# Patient Record
Sex: FEMALE | Race: WHITE | NOT HISPANIC OR LATINO | Employment: FULL TIME | URBAN - NONMETROPOLITAN AREA
[De-identification: names, ages, dates, MRNs, and addresses within clinical notes are randomized per-mention and may not be internally consistent; named-entity substitution may affect disease eponyms.]

---

## 2023-10-16 RX ORDER — ACETAMINOPHEN 325 MG/1
TABLET ORAL EVERY 6 HOURS
COMMUNITY
Start: 2022-03-12 | End: 2023-10-17 | Stop reason: WASHOUT

## 2023-10-16 RX ORDER — PRENATAL VIT 49/IRON FUM/FOLIC 6.75-0.2MG
TABLET ORAL
COMMUNITY

## 2023-10-16 RX ORDER — SERTRALINE HYDROCHLORIDE 50 MG/1
1 TABLET, FILM COATED ORAL DAILY
COMMUNITY
Start: 2022-05-10 | End: 2023-10-17 | Stop reason: WASHOUT

## 2023-10-16 RX ORDER — IBUPROFEN 600 MG/1
TABLET ORAL EVERY 6 HOURS
COMMUNITY
Start: 2022-03-12 | End: 2023-10-17 | Stop reason: WASHOUT

## 2023-10-17 ENCOUNTER — OFFICE VISIT (OUTPATIENT)
Dept: PRIMARY CARE | Facility: CLINIC | Age: 33
End: 2023-10-17
Payer: COMMERCIAL

## 2023-10-17 VITALS
DIASTOLIC BLOOD PRESSURE: 87 MMHG | BODY MASS INDEX: 27.38 KG/M2 | HEIGHT: 62 IN | HEART RATE: 82 BPM | SYSTOLIC BLOOD PRESSURE: 131 MMHG | WEIGHT: 148.8 LBS | RESPIRATION RATE: 14 BRPM | TEMPERATURE: 97.6 F | OXYGEN SATURATION: 98 %

## 2023-10-17 DIAGNOSIS — E66.3 OVERWEIGHT WITH BODY MASS INDEX (BMI) OF 27 TO 27.9 IN ADULT: ICD-10-CM

## 2023-10-17 DIAGNOSIS — Z23 IMMUNIZATION DUE: ICD-10-CM

## 2023-10-17 DIAGNOSIS — E78.5 HYPERLIPIDEMIA, UNSPECIFIED HYPERLIPIDEMIA TYPE: ICD-10-CM

## 2023-10-17 DIAGNOSIS — Z00.00 HEALTHCARE MAINTENANCE: Primary | ICD-10-CM

## 2023-10-17 PROBLEM — R03.0 ELEVATED BLOOD PRESSURE READING WITHOUT DIAGNOSIS OF HYPERTENSION: Status: ACTIVE | Noted: 2023-10-17

## 2023-10-17 PROBLEM — F32.1 DEPRESSION, MAJOR, SINGLE EPISODE, MODERATE (MULTI): Status: ACTIVE | Noted: 2023-10-17

## 2023-10-17 PROCEDURE — 90686 IIV4 VACC NO PRSV 0.5 ML IM: CPT | Performed by: FAMILY MEDICINE

## 2023-10-17 PROCEDURE — 99395 PREV VISIT EST AGE 18-39: CPT | Performed by: FAMILY MEDICINE

## 2023-10-17 PROCEDURE — 3008F BODY MASS INDEX DOCD: CPT | Performed by: FAMILY MEDICINE

## 2023-10-17 PROCEDURE — 90471 IMMUNIZATION ADMIN: CPT | Performed by: FAMILY MEDICINE

## 2023-10-17 NOTE — PATIENT INSTRUCTIONS
A daily supplement with powerful anti-inflammatory properties is ASTAXANTHIN (similar to beta-carotine).    This anti-oxidant can decrease inflammation, improve insulin sensitivity, decrease plaque formation in arteries, and decrease free radicals that cause cancers.      Take anywhere from 5-18 mg daily for 3 months.     These can be purchased at health stores such as [employer] or on Amazon.         Repeat this 3 month cycle twice a year.      Healthcare maintenance  Vaccines and screenings reviewed.  Questionnaires completed.  Health and wellness topics reviewed.  Diet and exercise recommendations revisited.  Routine blood work ordered today.     VACCINES:  -TDAP up to date until 2032.  -Flu vaccine recommended, administered today.  -Shingrix recommended ages 50+.    SCREENINGS:  -PAP is up to date via GYN  -Screening mammogram recommended starting at age 40  -Screening for colon cancer recommended starting at age 45.    LIFESTYLE MEASURES  -make sure you are avoiding refined carbs such as breads, pasta, cereal, candy, soda,  nutrition bars, granola, chips, and sugar sweetened beverages.      -eat 5- 7 servings daily of veggies,  healthy protein such as chicken, fish,  beans, and eggs, and include healthy fats in your diet such as seeds, nuts, olive oil, avocados, and salmon.   -exercise 4 - 6 days per week as you are able, 150 minutes total weekly divided up is recommended.  -Vitamin D is recommended at 1000 - 5000 IU international units daily.   -Always wear sunscreen when you have sun exposure.  -64 oz of water is recommended daily.  -Dental visits recommended every 6 months.  -Eye exam recommended every 2 years, for those with vision problems every year.      Hyperlipidemia  Lifestyle managed,TC/HDL ratio at goal but LDL elevated.  5/2022 TC/HDL ratio 2.9, , TRIG 68  Repeat lipid panel ordered today.    To lower this with lifestyle measures:  -make sure you are avoiding refined carbs such as breads, pasta,  cereal, candy, soda,  nutrition bars, granola, chips, and sugar sweetened beverages.      -eat 5- 7 servings daily of veggies,  healthy protein such as chicken, fish,  beans, and eggs, and include healthy fats in your diet such as seeds, nuts, olive oil, avocados, and salmon.   -exercise 4 - 6 days per week as you are able, 150 minutes total weekly divided up is recommended.  -consider taking the fiber product psyllium capsules or powder 2 times daily (generic brand is fine) , or a brand name psyllium such as [facility] or Metamucil.  -consider also adding plant stanols and sterols such as Nature Made CholestOff, available over the counter.      Fasting labs have been ordered, please do at your convenience.

## 2023-10-17 NOTE — PROGRESS NOTES
Subjective   Patient ID: Andrew Heath is a 33 y.o. female who presents for Annual Exam (Pt presents for annual physical exam- Abn given to pt and signed, pt verbalized understanding. Pt states no concerns at this time. ).    HPI     Patient presents today for annual physical.  Patient is doing well overall, they have no new concerns or issues.     WELLNESS VISIT  TDAP: 1/2022  SHINGRIX: N/A  PNEUMOVAX: N/A  PAP: 7/2023 - managed by GYN (Dr. Page)  MAMMO: dx in 2020 for benign lump evaluated by breast surgeon, rec annual screenings starting at 40  CSCOPE: N/A  DEXA: N/A  HEP C SCREEN: 8/2021 negative  CACS: N/A  LIPID: 5/2022 TC/HDL ratio 2.9, , TRIG 68    She has 3 kids (7, 5 , 1.5)  Older two are boys and really good with their younger sister.   had vasectomy    Diet: tries to be as balanced as possible, kids are picky but getting better which is helpful.    Exercise: no recently due to busy schedule with work/kids sports, etc. She notes there is schedule change that would allow her to quick routine at school before she has to pick her kids.    Dental visits up to date.  Vision screening up to date.    Patient is agreeable to influenza vaccine.    Cervical cancer screening: UTD, managed by GYN  Denies family history in first degree relative.  Denies pelvic pain, vaginal discharge, or vaginal bleeding.    Breast cancer screening: N/A  Denies family history in first degree relative.  Denies lumps/bumps, skin changes, nipple retraction, or nipple drainage.    Colon cancer screening: N/A  Denies family history in first degree relative.  Denies melena, hematochezia, constipation, diarrhea, bloating, change in bowel habits.    ROUTINE VISIT  CHRONIC CONDITIONS:    -Elevated BP readings  Denies hx of HTN  States usually normal when checked manually.    Patient denies chest pain, shortness of breath with exertion, palpitations, lower extremity edema, headache, or dizziness.     -MOOD  Hx of  "depression  Taking Sertraline 50 mg daily, started 7/2022    -HLD  Lifestyle managed,TC/HDL ratio at goal but LDL elevated.  No CACS due to age.  5/2022 TC/HDL ratio 2.9, , TRIG 68    Patient denies any facial droop, sudden vision loss, weakness or numbness on one side of body.   Patient denies chest pain, shortness of breath with exertion, palpitations, or symptoms of claudication.       Review of Systems   All other systems reviewed and are negative.      Objective   /87 (BP Location: Left arm, Patient Position: Sitting, BP Cuff Size: Small adult)   Pulse 82   Temp 36.4 °C (97.6 °F) (Temporal)   Resp 14   Ht 1.562 m (5' 1.5\")   Wt 67.5 kg (148 lb 12.8 oz)   LMP 10/03/2023 (Approximate)   SpO2 98%   BMI 27.66 kg/m²     Physical Exam  Vitals and nursing note reviewed.   Constitutional:       General: She is not in acute distress.     Appearance: Normal appearance. She is not toxic-appearing.   HENT:      Head: Normocephalic and atraumatic.   Eyes:      Extraocular Movements: Extraocular movements intact.      Pupils: Pupils are equal, round, and reactive to light.   Neck:      Thyroid: No thyromegaly.   Cardiovascular:      Rate and Rhythm: Normal rate and regular rhythm.      Heart sounds: No murmur heard.     No friction rub. No gallop.   Pulmonary:      Effort: Pulmonary effort is normal.      Breath sounds: Normal breath sounds. No wheezing, rhonchi or rales.   Abdominal:      General: Bowel sounds are normal. There is no distension.      Palpations: Abdomen is soft. There is no mass.      Tenderness: There is no abdominal tenderness. There is no guarding.   Musculoskeletal:      Right lower leg: No edema.      Left lower leg: No edema.   Lymphadenopathy:      Cervical: No cervical adenopathy.   Skin:     General: Skin is warm and dry.   Neurological:      General: No focal deficit present.      Mental Status: She is alert and oriented to person, place, and time.   Psychiatric:         Mood " and Affect: Mood normal.         Behavior: Behavior normal.         Assessment/Plan   Problem List Items Addressed This Visit             ICD-10-CM    Hyperlipidemia E78.5     Lifestyle managed,TC/HDL ratio at goal but LDL elevated.  5/2022 TC/HDL ratio 2.9, , TRIG 68  Repeat lipid panel ordered today.    To lower this with lifestyle measures:  -make sure you are avoiding refined carbs such as breads, pasta, cereal, candy, soda,  nutrition bars, granola, chips, and sugar sweetened beverages.      -eat 5- 7 servings daily of veggies,  healthy protein such as chicken, fish,  beans, and eggs, and include healthy fats in your diet such as seeds, nuts, olive oil, avocados, and salmon.   -exercise 4 - 6 days per week as you are able, 150 minutes total weekly divided up is recommended.  -consider taking the fiber product psyllium capsules or powder 2 times daily (generic brand is fine) , or a brand name psyllium such as [place] [facility] or Metamucil.  -consider also adding plant stanols and sterols such as Nature Made CholestOff, available over the counter.          Healthcare maintenance - Primary Z00.00     Vaccines and screenings reviewed.  Questionnaires completed.  Health and wellness topics reviewed.  Diet and exercise recommendations revisited.  Routine blood work ordered today.     VACCINES:  -TDAP up to date until 2032.  -Flu vaccine recommended, administered today.  -Shingrix recommended ages 50+.    SCREENINGS:  -PAP is up to date via GYN  -Screening mammogram recommended starting at age 40  -Screening for colon cancer recommended starting at age 45.    LIFESTYLE MEASURES  -make sure you are avoiding refined carbs such as breads, pasta, cereal, candy, soda,  nutrition bars, granola, chips, and sugar sweetened beverages.      -eat 5- 7 servings daily of veggies,  healthy protein such as chicken, fish,  beans, and eggs, and include healthy fats in your diet such as seeds, nuts, olive oil, avocados, and  yan.   -exercise 4 - 6 days per week as you are able, 150 minutes total weekly divided up is recommended.  -Vitamin D is recommended at 1000 - 5000 IU international units daily.   -Always wear sunscreen when you have sun exposure.  -64 oz of water is recommended daily.  -Dental visits recommended every 6 months.  -Eye exam recommended every 2 years, for those with vision problems every year.           Relevant Orders    CBC    Comprehensive Metabolic Panel    Lipid Panel     Other Visit Diagnoses         Codes    Immunization due     Z23    Relevant Orders    Flu vaccine (IIV4) age 6 months and greater, preservative free    Overweight with body mass index (BMI) of 27 to 27.9 in adult     E66.3, Z68.27          Follow-up in 1 year for annual physical.   Call for sooner follow-up if needed.     Scribe Attestation  By signing my name below, IRadha, Reginaldo   attest that this documentation has been prepared under the direction and in the presence of Tami Tineo DO.

## 2023-10-17 NOTE — ASSESSMENT & PLAN NOTE
Lifestyle managed,TC/HDL ratio at goal but LDL elevated.  5/2022 TC/HDL ratio 2.9, , TRIG 68  Repeat lipid panel ordered today.    To lower this with lifestyle measures:  -make sure you are avoiding refined carbs such as breads, pasta, cereal, candy, soda,  nutrition bars, granola, chips, and sugar sweetened beverages.      -eat 5- 7 servings daily of veggies,  healthy protein such as chicken, fish,  beans, and eggs, and include healthy fats in your diet such as seeds, nuts, olive oil, avocados, and salmon.   -exercise 4 - 6 days per week as you are able, 150 minutes total weekly divided up is recommended.  -consider taking the fiber product psyllium capsules or powder 2 times daily (generic brand is fine) , or a brand name psyllium such as [employer] or Metamucil.  -consider also adding plant stanols and sterols such as Nature Made CholestOff, available over the counter.

## 2023-10-17 NOTE — ASSESSMENT & PLAN NOTE
Vaccines and screenings reviewed.  Questionnaires completed.  Health and wellness topics reviewed.  Diet and exercise recommendations revisited.  Routine blood work ordered today.     VACCINES:  -TDAP up to date until 2032.  -Flu vaccine recommended, administered today.  -Shingrix recommended ages 50+.    SCREENINGS:  -PAP is up to date via GYN  -Screening mammogram recommended starting at age 40  -Screening for colon cancer recommended starting at age 45.    LIFESTYLE MEASURES  -make sure you are avoiding refined carbs such as breads, pasta, cereal, candy, soda,  nutrition bars, granola, chips, and sugar sweetened beverages.      -eat 5- 7 servings daily of veggies,  healthy protein such as chicken, fish,  beans, and eggs, and include healthy fats in your diet such as seeds, nuts, olive oil, avocados, and salmon.   -exercise 4 - 6 days per week as you are able, 150 minutes total weekly divided up is recommended.  -Vitamin D is recommended at 1000 - 5000 IU international units daily.   -Always wear sunscreen when you have sun exposure.  -64 oz of water is recommended daily.  -Dental visits recommended every 6 months.  -Eye exam recommended every 2 years, for those with vision problems every year.

## 2023-10-19 ENCOUNTER — TELEPHONE (OUTPATIENT)
Dept: PRIMARY CARE | Facility: CLINIC | Age: 33
End: 2023-10-19
Payer: COMMERCIAL

## 2023-10-19 NOTE — TELEPHONE ENCOUNTER
Patient had appointment with you this past Tuesday    She wanted you to be aware she tested positive for covid yesterday     She does not need anything, feels fine but just an FYI

## 2024-06-14 ENCOUNTER — LAB (OUTPATIENT)
Dept: LAB | Facility: LAB | Age: 34
End: 2024-06-14
Payer: COMMERCIAL

## 2024-06-14 DIAGNOSIS — Z00.00 HEALTHCARE MAINTENANCE: ICD-10-CM

## 2024-06-14 LAB
ALBUMIN SERPL BCP-MCNC: 4.4 G/DL (ref 3.4–5)
ALP SERPL-CCNC: 37 U/L (ref 33–110)
ALT SERPL W P-5'-P-CCNC: 9 U/L (ref 7–45)
ANION GAP SERPL CALC-SCNC: 11 MMOL/L (ref 10–20)
AST SERPL W P-5'-P-CCNC: 11 U/L (ref 9–39)
BILIRUB SERPL-MCNC: 0.5 MG/DL (ref 0–1.2)
BUN SERPL-MCNC: 11 MG/DL (ref 6–23)
CALCIUM SERPL-MCNC: 9.1 MG/DL (ref 8.6–10.6)
CHLORIDE SERPL-SCNC: 106 MMOL/L (ref 98–107)
CHOLEST SERPL-MCNC: 182 MG/DL (ref 0–199)
CHOLESTEROL/HDL RATIO: 2.7
CO2 SERPL-SCNC: 29 MMOL/L (ref 21–32)
CREAT SERPL-MCNC: 0.72 MG/DL (ref 0.5–1.05)
EGFRCR SERPLBLD CKD-EPI 2021: >90 ML/MIN/1.73M*2
ERYTHROCYTE [DISTWIDTH] IN BLOOD BY AUTOMATED COUNT: 12.7 % (ref 11.5–14.5)
GLUCOSE SERPL-MCNC: 89 MG/DL (ref 74–99)
HCT VFR BLD AUTO: 39.8 % (ref 36–46)
HDLC SERPL-MCNC: 66.8 MG/DL
HGB BLD-MCNC: 12.7 G/DL (ref 12–16)
LDLC SERPL CALC-MCNC: 105 MG/DL
MCH RBC QN AUTO: 28 PG (ref 26–34)
MCHC RBC AUTO-ENTMCNC: 31.9 G/DL (ref 32–36)
MCV RBC AUTO: 88 FL (ref 80–100)
NON HDL CHOLESTEROL: 115 MG/DL (ref 0–149)
NRBC BLD-RTO: 0 /100 WBCS (ref 0–0)
PLATELET # BLD AUTO: 266 X10*3/UL (ref 150–450)
POTASSIUM SERPL-SCNC: 4.1 MMOL/L (ref 3.5–5.3)
PROT SERPL-MCNC: 6.4 G/DL (ref 6.4–8.2)
RBC # BLD AUTO: 4.54 X10*6/UL (ref 4–5.2)
SODIUM SERPL-SCNC: 142 MMOL/L (ref 136–145)
TRIGL SERPL-MCNC: 50 MG/DL (ref 0–149)
VLDL: 10 MG/DL (ref 0–40)
WBC # BLD AUTO: 6 X10*3/UL (ref 4.4–11.3)

## 2024-06-14 PROCEDURE — 80053 COMPREHEN METABOLIC PANEL: CPT

## 2024-06-14 PROCEDURE — 85027 COMPLETE CBC AUTOMATED: CPT

## 2024-06-14 PROCEDURE — 80061 LIPID PANEL: CPT

## 2024-06-14 PROCEDURE — 36415 COLL VENOUS BLD VENIPUNCTURE: CPT

## 2024-06-14 NOTE — RESULT ENCOUNTER NOTE
Total and bad cholesterol have dropped nicely since last check.  Blood counts are all reassuring, WNL  Kidney/liver/sugar/electrolyte panel is all reassuring  Continue current efforts

## 2024-08-05 ENCOUNTER — APPOINTMENT (OUTPATIENT)
Dept: OBSTETRICS AND GYNECOLOGY | Facility: CLINIC | Age: 34
End: 2024-08-05
Payer: COMMERCIAL

## 2024-08-05 VITALS
WEIGHT: 162 LBS | DIASTOLIC BLOOD PRESSURE: 78 MMHG | SYSTOLIC BLOOD PRESSURE: 120 MMHG | HEIGHT: 62 IN | BODY MASS INDEX: 29.81 KG/M2

## 2024-08-05 DIAGNOSIS — Z01.419 WELL WOMAN EXAM: ICD-10-CM

## 2024-08-05 PROCEDURE — 88175 CYTOPATH C/V AUTO FLUID REDO: CPT

## 2024-08-05 PROCEDURE — 3008F BODY MASS INDEX DOCD: CPT | Performed by: OBSTETRICS & GYNECOLOGY

## 2024-08-05 PROCEDURE — 99395 PREV VISIT EST AGE 18-39: CPT | Performed by: OBSTETRICS & GYNECOLOGY

## 2024-08-05 ASSESSMENT — PAIN SCALES - GENERAL: PAINLEVEL: 0-NO PAIN

## 2024-08-05 NOTE — PROGRESS NOTES
"Andrew Heath is a 34 y.o. female who is here for a routine exam. PCP = Tami Tineo, DO    Menses : monthly  Contraception : vasectomy  HPV vaccine : No  Last pap : 2023 normal  Last HPV : 2022 negative  History of abnormal pap : No  Last mammogram : never  History of abnormal mammogram : No  Colon cancer screen : never    ROS  systems reviewed, anything negative noted in HPI    bladder: no dysuria, gross hematuria, urinary frequency, urgency or incontinence  breast: no lumps, nipple d/c, overlying skin changes, redness, or skin retraction    [unfilled]    Past med hx and past surg hx reviewed and notable for: none    Objective   /78   Ht 1.562 m (5' 1.5\")   Wt 73.5 kg (162 lb)   LMP 07/19/2024 (Approximate)   BMI 30.11 kg/m²      General:   Alert and oriented, in no acute distress   Neck: Supple. No visible thyromegaly.    Breast/Axilla: Normal to palpation bilaterally without masses, skin changes, lymphadenopathy, or nipple discharge.    Abdomen: Soft, non-tender, without masses or organomegaly   Vulva: Normal architecture without erythema, masses, or lesions.    Vagina: Normal mucosa without lesions, masses, or atrophy. No abnormal vaginal discharge.    Cervix: Normal without masses, lesions, or signs of cervicitis.    Uterus: Normal mobile, non-enlarged uterus    Adnexa: Normal without masses or lesions   Pelvic Floor No POP noted.    Psych Normal affect. Normal mood.      Thank you for coming to your annual exam. Your findings during the exam were normal. Specific topics addressed during this exam included: healthy lifestyle, well woman screening guidelines,     Actions performed during this visit include:  - Clinical breast exam  - Clinical pelvic exam  - pap  No orders of the defined types were placed in this encounter.          Please return for your next visit in 1 year.  "

## 2024-08-19 LAB
CYTOLOGY CMNT CVX/VAG CYTO-IMP: NORMAL
LAB AP HPV GENOTYPE QUESTION: YES
LAB AP HPV HR: NORMAL
LABORATORY COMMENT REPORT: NORMAL
LMP START DATE: NORMAL
PATH REPORT.TOTAL CANCER: NORMAL

## 2024-10-02 ENCOUNTER — OFFICE VISIT (OUTPATIENT)
Dept: URGENT CARE | Age: 34
End: 2024-10-02
Payer: COMMERCIAL

## 2024-10-02 VITALS
RESPIRATION RATE: 16 BRPM | DIASTOLIC BLOOD PRESSURE: 85 MMHG | WEIGHT: 162 LBS | HEART RATE: 77 BPM | OXYGEN SATURATION: 99 % | TEMPERATURE: 97.6 F | BODY MASS INDEX: 30.58 KG/M2 | SYSTOLIC BLOOD PRESSURE: 125 MMHG | HEIGHT: 61 IN

## 2024-10-02 DIAGNOSIS — J02.9 PHARYNGITIS, UNSPECIFIED ETIOLOGY: ICD-10-CM

## 2024-10-02 LAB — POC RAPID STREP: NEGATIVE

## 2024-10-02 PROCEDURE — 87651 STREP A DNA AMP PROBE: CPT

## 2024-10-02 NOTE — LETTER
October 2, 2024     Patient: Andrew Heath   YOB: 1990   Date of Visit: 10/2/2024       To Whom It May Concern:    Andrew Heath was seen in my clinic on 10/2/2024 at 11:30 am. Please excuse her from work 10/2/24 - 10/3/24, able to return without restrictions on 10/4/24.    If you have any questions or concerns, please don't hesitate to call.         Sincerely,         Suraj Diaz PA-C        CC: No Recipients

## 2024-10-02 NOTE — PROGRESS NOTES
Subjective   Patient ID: Andrew Heath is a 34 y.o. female. They present today with a chief complaint of Generalized Body Aches (Since yesterday), Headache (Since yesterday ), and Sore Throat (X 2 days ).    Patient disposition: Home    HISTORY OF PRESENT ILLNESS:    OHF adult presents for 2d of ST, malaise, temp 99 deg F, body aches. Concerned about possible strep. Admits nasal congestion as well. Son sick with same sx currently and tested negative for strep yesterday. Denies cough, dyspnea, HA, GI sx.    Past Medical History  Allergies as of 10/02/2024    (No Known Allergies)       (Not in a hospital admission)       Past Medical History:   Diagnosis Date    13 weeks gestation of pregnancy ([facility]) 09/03/2021    13 weeks gestation of pregnancy    16 weeks gestation of pregnancy ([facility]) 09/27/2021    16 weeks gestation of pregnancy    Encounter for gynecological examination (general) (routine) without abnormal findings     Pap smear, as part of routine gynecological examination    Encounter for gynecological examination (general) (routine) without abnormal findings     Pap smear, as part of routine gynecological examination    Encounter for supervision of other normal pregnancy, second trimester 11/22/2021    Encounter for supervision of other normal pregnancy, second trimester    Encounter for supervision of other normal pregnancy, third trimester 03/07/2022    Encounter for supervision of normal pregnancy in multigravida in third trimester    Other conditions influencing health status 08/02/2021    History of pregnancy    Other specified disorders of amniotic fluid and membranes, first trimester, not applicable or unspecified ([facility]) 08/02/2021    Subchorionic hematoma in first trimester, single or unspecified fetus    Personal history of other diseases of the female genital tract 08/02/2021    History of amenorrhea    Personal history of other diseases of the nervous system and sense organs     History of  "migraine    Personal history of other infectious and parasitic diseases     History of varicella    Personal history of other mental and behavioral disorders     History of depression       Past Surgical History:   Procedure Laterality Date    MOUTH SURGERY  09/16/2014    Oral Surgery Tooth Extraction        reports that she has quit smoking. Her smoking use included cigarettes. She has never used smokeless tobacco. Alcohol use questions deferred to the physician. Drug use questions deferred to the physician.    Review of Systems    Negative except as documented in the History of Present Illness.                             Objective    Vitals:    10/02/24 1120   BP: 125/85   Pulse: 77   Resp: 16   Temp: 36.4 °C (97.6 °F)   SpO2: 99%   Weight: 73.5 kg (162 lb)   Height: 1.549 m (5' 1\")     Patient's last menstrual period was 09/18/2024 (approximate).      PHYSICAL EXAMINATION:    CONSTITUTIONAL: well-appearing, nontoxic         ENT:  Head and face are unremarkable and atraumatic. Mucous membranes moist.    * Oropharynx erythematous without exudate.    * No uvular deviation. No visible abscess.    * Lymphadenopathy absent.    * TMs nl bl.         LUNGS:  CTAB, no r/r/w.    CARDIOVASCULAR:   RRR, no m/r/g. Nl S1/S2.    ABDOMEN:  Nontender including left upper quadrant, nondistended, no acute abdomen.     MUSCULOSKELETAL: No obvious deformities. TENORIO with equal strength. Gait normal.    SKIN:   Warm and dry with no rashes.    NEURO:  Normal baseline mental status.    PSYCH: Appropriate mood and affect.         ------------------------------------------         MDM: Clinically likely viral pharyngitis. RST negative and strep PCR pending. Pt declined COVID-19 testing. Will fu here PRN. Recommended OTC remedies for sx.        Procedures    Diagnostic study results (if any) were reviewed by Suraj Diaz PA-C.    Results for orders placed or performed in visit on 10/02/24   POCT rapid strep A manually resulted   Result " Value Ref Range    POC Rapid Strep Negative Negative        Assessment/Plan   Allergies, medications, history, and pertinent labs/EKGs/Imaging reviewed by Suraj Diaz PA-C.     Orders and Diagnoses  Diagnoses and all orders for this visit:  Sore throat  -     POCT rapid strep A manually resulted      Medical Admin Record      Follow Up Instructions  No follow-ups on file.    Electronically signed by Suraj Diaz PA-C  11:39 AM

## 2024-10-03 LAB — S PYO DNA THROAT QL NAA+PROBE: NOT DETECTED

## 2024-10-21 ENCOUNTER — APPOINTMENT (OUTPATIENT)
Dept: PRIMARY CARE | Facility: CLINIC | Age: 34
End: 2024-10-21
Payer: COMMERCIAL

## 2024-11-11 ENCOUNTER — APPOINTMENT (OUTPATIENT)
Dept: PRIMARY CARE | Facility: CLINIC | Age: 34
End: 2024-11-11
Payer: COMMERCIAL

## 2024-11-11 VITALS
DIASTOLIC BLOOD PRESSURE: 70 MMHG | OXYGEN SATURATION: 98 % | WEIGHT: 163.9 LBS | HEART RATE: 68 BPM | SYSTOLIC BLOOD PRESSURE: 132 MMHG | BODY MASS INDEX: 30.94 KG/M2 | TEMPERATURE: 98.7 F | HEIGHT: 61 IN

## 2024-11-11 DIAGNOSIS — E66.811 CLASS 1 OBESITY WITH BODY MASS INDEX (BMI) OF 30.0 TO 30.9 IN ADULT, UNSPECIFIED OBESITY TYPE, UNSPECIFIED WHETHER SERIOUS COMORBIDITY PRESENT: ICD-10-CM

## 2024-11-11 DIAGNOSIS — Z23 IMMUNIZATION DUE: ICD-10-CM

## 2024-11-11 DIAGNOSIS — Z00.00 HEALTHCARE MAINTENANCE: Primary | ICD-10-CM

## 2024-11-11 DIAGNOSIS — R03.0 ELEVATED BLOOD PRESSURE READING WITHOUT DIAGNOSIS OF HYPERTENSION: ICD-10-CM

## 2024-11-11 PROBLEM — Z86.19 HISTORY OF VARICELLA: Status: RESOLVED | Noted: 2024-11-11 | Resolved: 2024-11-11

## 2024-11-11 PROBLEM — F32.1 DEPRESSION, MAJOR, SINGLE EPISODE, MODERATE (MULTI): Status: RESOLVED | Noted: 2023-10-17 | Resolved: 2024-11-11

## 2024-11-11 PROBLEM — N63.0 BREAST LUMP IN UPPER OUTER QUADRANT: Status: RESOLVED | Noted: 2024-11-11 | Resolved: 2024-11-11

## 2024-11-11 PROCEDURE — 90656 IIV3 VACC NO PRSV 0.5 ML IM: CPT | Performed by: FAMILY MEDICINE

## 2024-11-11 PROCEDURE — 99395 PREV VISIT EST AGE 18-39: CPT | Performed by: FAMILY MEDICINE

## 2024-11-11 PROCEDURE — 3008F BODY MASS INDEX DOCD: CPT | Performed by: FAMILY MEDICINE

## 2024-11-11 PROCEDURE — 90471 IMMUNIZATION ADMIN: CPT | Performed by: FAMILY MEDICINE

## 2024-11-11 NOTE — ASSESSMENT & PLAN NOTE
BP is 139/85 in office today. Goal BP is 130/80 or less, ideally 120/80 or less.   Reports BP normotensive outside the office, will treat based off these numbers at this time.    Will have MA recheck prior to discharge, if continues to be elevated in office I have asked that patient check numbers outside the office, watch salt intake, and add in resistance training. She should reach out for sooner appointment if consistently above 130/80s outside the office.    Patient is encouraged to continue low sodium diet (Dietary Approach to Stop Hypertension, or DASH diet) .   Exercise most days of the week.   Limit refined carbohydrates such as pretzels, cereals, breads, pastries, alcohol.    If patient wishes to try a natural approach to lowering blood pressure, can consider:  -whey protein 20 -30 g daily (hydrolyzed is best, but any is ok)  -aged garlic 600 mg twice daily ([employer] brand aged garlic on line is one example)   -CoQ10 supplement at 120 mg - 360 mg daily (average dose studied 225mg daily).     Patient to check BP regularly at home and keep a diary.  This should be taken after sitting with feet flat on floor and resting for 5 minutes.   Arm should be level with your heart.   New guidelines recommend goal for blood pressure less than 130/80.   Ideally for stroke and heart attack risk reduction the systolic, or top, blood pressure number should be in the 110's or 120's.    PLEASE BRING BP CUFF IN TO NEXT VISIT FOR VALIDATION - TAKE YOUR BP @ HOME BEFORE YOU COME!

## 2024-11-11 NOTE — ASSESSMENT & PLAN NOTE
LIPID: 6/2024 TC/HDL ratio 2.7, , TRIG 50  LIPID: 5/2022 TC/HDL ratio 2.9, , TRIG 68  Goal TC/HDL ratio 3.4 or less, LDL 99 or less,  or less, and TRIG 150 or less.     Cholesterol improved from previous year, at goal now :)  Lifestyle managed, diet and exercise recommendations revisited.     Consider getting therabands for resistance training!    To lower this with lifestyle measures:  -make sure you are avoiding refined carbs such as breads, pasta, cereal, candy, soda,  nutrition bars, granola, chips, and sugar sweetened beverages.      -eat 5- 7 servings daily of veggies,  healthy protein such as chicken, fish,  beans, and eggs, and include healthy fats in your diet such as seeds, nuts, olive oil, avocados, and salmon.   -exercise 4 - 6 days per week as you are able, 150 minutes total weekly divided up is recommended.  -consider taking the fiber product psyllium capsules or powder 2 times daily (generic brand is fine) , or a brand name psyllium such as [place] [facility] or Metamucil.  -consider also adding plant stanols and sterols such as Nature Made CholestOff, available over the counter.

## 2024-11-11 NOTE — ASSESSMENT & PLAN NOTE
Vaccines and screenings reviewed.  Questionnaires completed.  Health and wellness topics reviewed.  Diet and exercise recommendations revisited.  Routine blood work reviewed today, repeat labs ordered as indicated.    VACCINES:  -TDAP up to date until 2032.  -Flu vaccine recommended, administered today.  -Shingrix recommended ages 50+.    SCREENINGS:  -PAP is up to date via GYN  -Screening mammogram recommended starting at age 40  -Screening for colon cancer recommended starting at age 45.    LIFESTYLE MEASURES  -make sure you are avoiding refined carbs such as breads, pasta, cereal, candy, soda,  nutrition bars, granola, chips, and sugar sweetened beverages.      -eat 5- 7 servings daily of veggies,  healthy protein such as chicken, fish,  beans, and eggs, and include healthy fats in your diet such as seeds, nuts, olive oil, avocados, and salmon.   -exercise 4 - 6 days per week as you are able, 150 minutes total weekly divided up is recommended.  -Vitamin D is recommended at 1000 - 5000 IU international units daily.   -Always wear sunscreen when you have sun exposure.  -64 oz of water is recommended daily.  -Dental visits recommended every 6 months.  -Eye exam recommended every 2 years, for those with vision problems every year.

## 2024-11-11 NOTE — PROGRESS NOTES
Subjective   Patient ID: Andrew Rosador is a 34 y.o. female who presents for Annual Exam (Pt presents for annual physical exam- ABN was given to pt and signed, pt verbalized understanding. Pt states no concerns/questions at this time.) and Flu Vaccine (Pt would like to receive their flu vaccine today.  ).    HPI     Patient presents today for annual physical.  Patient is doing well overall, they have no new concerns or issues.     WELLNESS VISIT   TDAP: 1/2022  SHINGRIX: N/A  PNEUMOVAX: N/A  PAP: 8/2024 - managed by GYN (Dr. Page)  MAMMO: dx in 2020 for benign lump evaluated by breast surgeon, rec annual screenings starting at 40  CSCOPE: N/A  DEXA: N/A  HEP C SCREEN: 8/2021 negative  CACS: N/A  LIPID: 6/2024 TC/HDL ratio 2.7, , TRIG 50    Patient is agreeable to influenza vaccine.    Diet: part of CSA, overall well balanced, eats leans meats and minimizes red meats  Exercise: admits room for improvement, walking dog more that sports are over for the kids, has plans to increase  Alcohol use: 6-7 per week  Smoking: non-smoker    Dental: UTD  Vision: UTD    Cervical cancer screening: utd - managed via GYN  Denies family history in first degree relative.  Denies pelvic pain, vaginal discharge, or vaginal bleeding.    Breast cancer screening: n/a  Denies family history in first degree relative.  Denies lumps/bumps, skin changes, nipple retraction, or nipple drainage.    Colon cancer screening: n/a  Denies family history in first degree relative.  Denies melena, hematochezia, constipation, diarrhea, bloating, change in bowel habits.    Thyroid disorder screening:   Denies family history in first degree relative.  Denies heat or cold intolerance, diarrhea or constipation, coarsening of hair, and palpitations.     Cardiac disorder screening:   Denies family history in first degree relative.  Denies chest pain, SOB, palpitations, edema, dizziness.     Review of Systems   All other systems reviewed and are  "negative.      Objective   /85 (BP Location: Right arm, Patient Position: Sitting, BP Cuff Size: Adult)   Pulse 68   Temp 37.1 °C (98.7 °F) (Temporal)   Ht 1.549 m (5' 1\")   Wt 74.3 kg (163 lb 14.4 oz)   LMP 11/01/2024 (Exact Date)   SpO2 98%   BMI 30.97 kg/m²     Physical Exam  Vitals and nursing note reviewed.   Constitutional:       General: She is not in acute distress.     Appearance: Normal appearance. She is not toxic-appearing.   HENT:      Head: Normocephalic and atraumatic.   Eyes:      Extraocular Movements: Extraocular movements intact.      Pupils: Pupils are equal, round, and reactive to light.   Neck:      Thyroid: No thyromegaly.   Cardiovascular:      Rate and Rhythm: Normal rate and regular rhythm.      Heart sounds: No murmur heard.     No friction rub. No gallop.   Pulmonary:      Effort: Pulmonary effort is normal.      Breath sounds: Normal breath sounds. No wheezing, rhonchi or rales.   Abdominal:      General: Bowel sounds are normal. There is no distension.      Palpations: Abdomen is soft. There is no mass.      Tenderness: There is no abdominal tenderness. There is no guarding.   Musculoskeletal:      Right lower leg: Edema (trace) present.      Left lower leg: Edema (trace) present.   Lymphadenopathy:      Cervical: No cervical adenopathy.   Skin:     General: Skin is warm and dry.   Neurological:      General: No focal deficit present.      Mental Status: She is alert and oriented to person, place, and time.   Psychiatric:         Mood and Affect: Mood normal.         Behavior: Behavior normal.         Assessment/Plan   Problem List Items Addressed This Visit             ICD-10-CM    Elevated blood pressure reading without diagnosis of hypertension R03.0     BP is 139/85 in office today. Goal BP is 130/80 or less, ideally 120/80 or less.   Will have MA recheck prior to discharge.  Reports BP normotensive outside the office, will treat based off these numbers.    Patient is " encouraged to continue low sodium diet (Dietary Approach to Stop Hypertension, or DASH diet) .   Exercise most days of the week.   Limit refined carbohydrates such as pretzels, cereals, breads, pastries, alcohol.    If patient wishes to try a natural approach to lowering blood pressure, can consider:  -whey protein 20 -30 g daily (hydrolyzed is best, but any is ok)  -aged garlic 600 mg twice daily ([employer] brand aged garlic on line is one example)   -CoQ10 supplement at 120 mg - 360 mg daily (average dose studied 225mg daily).     Patient to check BP regularly at home and keep a diary.  This should be taken after sitting with feet flat on floor and resting for 5 minutes.   Arm should be level with your heart.   New guidelines recommend goal for blood pressure less than 130/80.   Ideally for stroke and heart attack risk reduction the systolic, or top, blood pressure number should be in the 110's or 120's.    PLEASE BRING BP CUFF IN TO NEXT VISIT FOR VALIDATION - TAKE YOUR BP @ HOME BEFORE YOU COME!          Healthcare maintenance - Primary Z00.00     Vaccines and screenings reviewed.  Questionnaires completed.  Health and wellness topics reviewed.  Diet and exercise recommendations revisited.  Routine blood work reviewed today, repeat labs ordered as indicated.    VACCINES:  -TDAP up to date until 2032.  -Flu vaccine recommended, administered today.  -Shingrix recommended ages 50+.    SCREENINGS:  -PAP is up to date via GYN  -Screening mammogram recommended starting at age 40  -Screening for colon cancer recommended starting at age 45.    LIFESTYLE MEASURES  -make sure you are avoiding refined carbs such as breads, pasta, cereal, candy, soda,  nutrition bars, granola, chips, and sugar sweetened beverages.      -eat 5- 7 servings daily of veggies,  healthy protein such as chicken, fish,  beans, and eggs, and include healthy fats in your diet such as seeds, nuts, olive oil, avocados, and salmon.   -exercise 4 - 6 days  per week as you are able, 150 minutes total weekly divided up is recommended.  -Vitamin D is recommended at 1000 - 5000 IU international units daily.   -Always wear sunscreen when you have sun exposure.  -64 oz of water is recommended daily.  -Dental visits recommended every 6 months.  -Eye exam recommended every 2 years, for those with vision problems every year.            Other Visit Diagnoses         Codes    Class 1 obesity with body mass index (BMI) of 30.0 to 30.9 in adult, unspecified obesity type, unspecified whether serious comorbidity present     E66.811, Z68.30    Immunization due     Z23    Relevant Orders    Flu vaccine, trivalent, preservative free, age 6 months and greater (Fluarix/Fluzone/Flulaval) (Completed)            Follow-up in 1 year for annual physical.   Call for sooner follow-up if needed.       Scribe Attestation  By signing my name below, Radha NI, Reginaldo   attest that this documentation has been prepared under the direction and in the presence of Tami Tineo DO.

## 2025-01-24 ENCOUNTER — OFFICE VISIT (OUTPATIENT)
Dept: URGENT CARE | Age: 35
End: 2025-01-24
Payer: COMMERCIAL

## 2025-01-24 VITALS
WEIGHT: 165 LBS | SYSTOLIC BLOOD PRESSURE: 145 MMHG | OXYGEN SATURATION: 99 % | RESPIRATION RATE: 16 BRPM | BODY MASS INDEX: 31.18 KG/M2 | HEART RATE: 107 BPM | DIASTOLIC BLOOD PRESSURE: 90 MMHG | TEMPERATURE: 98.3 F

## 2025-01-24 DIAGNOSIS — J02.0 STREP PHARYNGITIS: ICD-10-CM

## 2025-01-24 LAB — POC RAPID STREP: POSITIVE

## 2025-01-24 PROCEDURE — 99214 OFFICE O/P EST MOD 30 MIN: CPT | Performed by: PHYSICIAN ASSISTANT

## 2025-01-24 PROCEDURE — 87880 STREP A ASSAY W/OPTIC: CPT | Performed by: PHYSICIAN ASSISTANT

## 2025-01-24 PROCEDURE — 1036F TOBACCO NON-USER: CPT | Performed by: PHYSICIAN ASSISTANT

## 2025-01-24 RX ORDER — AMOXICILLIN 500 MG/1
500 CAPSULE ORAL 2 TIMES DAILY
Qty: 21 CAPSULE | Refills: 0 | Status: SHIPPED | OUTPATIENT
Start: 2025-01-24 | End: 2025-02-03

## 2025-01-24 NOTE — PROGRESS NOTES
Subjective   Patient ID: Andrew Heath is a 34 y.o. female. They present today with a chief complaint of strep    Patient disposition: Home    HISTORY OF PRESENT ILLNESS:    [facility] adult presents for strep testing. She has a hx of strep recurrent in the past. Son sick with ST for past week. Admits malaise and body aches. Denies cough, HA, abd pain, dyspnea, CP.    Past Medical History  Allergies as of 01/24/2025    (No Known Allergies)       (Not in a hospital admission)       Past Medical History:   Diagnosis Date    13 weeks gestation of pregnancy ([facility]) 09/03/2021    13 weeks gestation of pregnancy    16 weeks gestation of pregnancy ([facility]) 09/27/2021    16 weeks gestation of pregnancy    Breast lump in upper outer quadrant 11/11/2024    Depression, major, single episode, moderate (Multi) 10/17/2023    Encounter for gynecological examination (general) (routine) without abnormal findings     Pap smear, as part of routine gynecological examination    Encounter for gynecological examination (general) (routine) without abnormal findings     Pap smear, as part of routine gynecological examination    Encounter for supervision of other normal pregnancy, second trimester 11/22/2021    Encounter for supervision of other normal pregnancy, second trimester    Encounter for supervision of other normal pregnancy, third trimester 03/07/2022    Encounter for supervision of normal pregnancy in multigravida in third trimester    History of varicella 11/11/2024    Other conditions influencing health status 08/02/2021    History of pregnancy    Other specified disorders of amniotic fluid and membranes, first trimester, not applicable or unspecified ([facility]) 08/02/2021    Subchorionic hematoma in first trimester, single or unspecified fetus    Personal history of other diseases of the female genital tract 08/02/2021    History of amenorrhea    Personal history of other diseases of the nervous system and sense organs     History  of migraine    Personal history of other infectious and parasitic diseases     History of varicella    Personal history of other mental and behavioral disorders     History of depression       Past Surgical History:   Procedure Laterality Date    MOUTH SURGERY  09/16/2014    Oral Surgery Tooth Extraction        reports that she has quit smoking. Her smoking use included cigarettes. She has never used smokeless tobacco. She reports that she does not currently use alcohol. Drug use questions deferred to the physician.    Review of Systems    Negative except as documented in the History of Present Illness.                             Objective    Vitals:    01/24/25 1104   BP: 145/90   Pulse: 107   Resp: 16   Temp: 36.8 °C (98.3 °F)   SpO2: 99%   Weight: 74.8 kg (165 lb)     No LMP recorded.      PHYSICAL EXAMINATION:    CONSTITUTIONAL: well-appearing, nontoxic         ENT:  Head and face are unremarkable and atraumatic. Mucous membranes moist.    * Oropharynx erythematous without exudate. Airway patent.    * No uvular deviation. No visible abscess.    * Lymphadenopathy absent.    * TMs nl bl.         LUNGS:  CTAB, no r/r/w.    CARDIOVASCULAR:   RRR, no m/r/g. Nl S1/S2.    ABDOMEN:  Nontender including left upper quadrant, nondistended, no acute abdomen.     MUSCULOSKELETAL: No obvious deformities. TENORIO with equal strength. Gait normal.    SKIN:   Warm and dry with no rashes.    NEURO:  Normal baseline mental status.    PSYCH: Appropriate mood and affect.         ------------------------------------------         MDM: RST+ and strep treated. Will fu here PRN.        Procedures    Diagnostic study results (if any) were reviewed by Suraj Diaz PA-C.    No results found for this visit on 01/24/25.     Assessment/Plan   Allergies, medications, history, and pertinent labs/EKGs/Imaging reviewed by Suraj Diaz PA-C.     Orders and Diagnoses  Diagnoses and all orders for this visit:  Sore throat  -     POCT rapid strep  A manually resulted      Medical Admin Record      Follow Up Instructions  No follow-ups on file.    Electronically signed by Suraj Diaz PA-C  12:00 PM

## 2025-03-26 ENCOUNTER — OFFICE VISIT (OUTPATIENT)
Dept: URGENT CARE | Age: 35
End: 2025-03-26
Payer: COMMERCIAL

## 2025-03-26 VITALS
OXYGEN SATURATION: 99 % | SYSTOLIC BLOOD PRESSURE: 143 MMHG | BODY MASS INDEX: 30.23 KG/M2 | WEIGHT: 160 LBS | HEART RATE: 85 BPM | TEMPERATURE: 96.8 F | DIASTOLIC BLOOD PRESSURE: 87 MMHG | RESPIRATION RATE: 16 BRPM

## 2025-03-26 DIAGNOSIS — J02.9 ACUTE SORE THROAT: ICD-10-CM

## 2025-03-26 LAB
POC HUMAN RHINOVIRUS PCR: NEGATIVE
POC INFLUENZA A VIRUS PCR: NEGATIVE
POC INFLUENZA B VIRUS PCR: NEGATIVE
POC RAPID STREP: NEGATIVE
POC RESPIRATORY SYNCYTIAL VIRUS PCR: NEGATIVE
POC STREPTOCOCCUS PYOGENES (GROUP A STREP) PCR: NEGATIVE

## 2025-03-26 PROCEDURE — 1036F TOBACCO NON-USER: CPT

## 2025-03-26 PROCEDURE — 87631 RESP VIRUS 3-5 TARGETS: CPT

## 2025-03-26 PROCEDURE — 99213 OFFICE O/P EST LOW 20 MIN: CPT

## 2025-03-26 PROCEDURE — 87651 STREP A DNA AMP PROBE: CPT

## 2025-03-26 ASSESSMENT — ENCOUNTER SYMPTOMS
SORE THROAT: 1
CONSTITUTIONAL NEGATIVE: 1
NEUROLOGICAL NEGATIVE: 1
SINUS PAIN: 0
EYES NEGATIVE: 1
RHINORRHEA: 0
PSYCHIATRIC NEGATIVE: 1
SINUS PRESSURE: 0
GASTROINTESTINAL NEGATIVE: 1
CARDIOVASCULAR NEGATIVE: 1
RESPIRATORY NEGATIVE: 1
MUSCULOSKELETAL NEGATIVE: 1

## 2025-03-26 NOTE — PROGRESS NOTES
Subjective   Patient ID: Andrew Heath is a 34 y.o. female. They present today with a chief complaint of Illness (Strep check, son positive) and Sore Throat.    History of Present Illness  C/o sore throat s/s x < 1 day(s). Son (+) for strep. Has tried OTC meds with mild relief. Denies any CP, SOB, HA, fever, abdominal pain, and nausea/vomiting otherwise.      History provided by:  Patient  Illness  Associated symptoms: sore throat    Associated symptoms: no ear pain and no rhinorrhea    Sore Throat   Pertinent negatives include no ear discharge or ear pain.       Past Medical History  Allergies as of 03/26/2025    (No Known Allergies)       (Not in a hospital admission)       Past Medical History:   Diagnosis Date    13 weeks gestation of pregnancy ([facility]) 09/03/2021    13 weeks gestation of pregnancy    16 weeks gestation of pregnancy ([facility]) 09/27/2021    16 weeks gestation of pregnancy    Breast lump in upper outer quadrant 11/11/2024    Depression, major, single episode, moderate (Multi) 10/17/2023    Encounter for gynecological examination (general) (routine) without abnormal findings     Pap smear, as part of routine gynecological examination    Encounter for gynecological examination (general) (routine) without abnormal findings     Pap smear, as part of routine gynecological examination    Encounter for supervision of other normal pregnancy, second trimester 11/22/2021    Encounter for supervision of other normal pregnancy, second trimester    Encounter for supervision of other normal pregnancy, third trimester 03/07/2022    Encounter for supervision of normal pregnancy in multigravida in third trimester    History of varicella 11/11/2024    Other conditions influencing health status 08/02/2021    History of pregnancy    Other specified disorders of amniotic fluid and membranes, first trimester, not applicable or unspecified ([facility]) 08/02/2021    Subchorionic hematoma in first trimester, single or  unspecified fetus    Personal history of other diseases of the female genital tract 08/02/2021    History of amenorrhea    Personal history of other diseases of the nervous system and sense organs     History of migraine    Personal history of other infectious and parasitic diseases     History of varicella    Personal history of other mental and behavioral disorders     History of depression       Past Surgical History:   Procedure Laterality Date    MOUTH SURGERY  09/16/2014    Oral Surgery Tooth Extraction        reports that she has quit smoking. Her smoking use included cigarettes. She has never used smokeless tobacco. She reports that she does not currently use alcohol. Drug use questions deferred to the physician.    Review of Systems  Review of Systems   Constitutional: Negative.    HENT:  Positive for postnasal drip and sore throat. Negative for ear discharge, ear pain, rhinorrhea, sinus pressure and sinus pain.    Eyes: Negative.    Respiratory: Negative.     Cardiovascular: Negative.    Gastrointestinal: Negative.    Musculoskeletal: Negative.    Skin: Negative.    Neurological: Negative.    Psychiatric/Behavioral: Negative.                                    Objective    Vitals:    03/26/25 1532   BP: 143/87   Pulse: 85   Resp: 16   Temp: 36 °C (96.8 °F)   SpO2: 99%   Weight: 72.6 kg (160 lb)     No LMP recorded.    Physical Exam  Vitals and nursing note reviewed.   Constitutional:       General: She is not in acute distress.     Appearance: Normal appearance. She is not ill-appearing.   HENT:      Head: Normocephalic and atraumatic.      Right Ear: Tympanic membrane and ear canal normal.      Left Ear: Tympanic membrane and ear canal normal.      Nose: Nose normal. No rhinorrhea.      Mouth/Throat:      Mouth: Mucous membranes are moist.      Pharynx: Oropharynx is clear. Posterior oropharyngeal erythema present. No oropharyngeal exudate.   Eyes:      Extraocular Movements: Extraocular movements intact.       Pupils: Pupils are equal, round, and reactive to light.   Cardiovascular:      Rate and Rhythm: Normal rate and regular rhythm.      Pulses: Normal pulses.      Heart sounds: Normal heart sounds.   Pulmonary:      Effort: Pulmonary effort is normal. No respiratory distress.      Breath sounds: Normal breath sounds. No wheezing or rhonchi.   Abdominal:      General: Abdomen is flat. Bowel sounds are normal.      Palpations: Abdomen is soft.   Skin:     General: Skin is warm and dry.   Neurological:      Mental Status: She is alert and oriented to person, place, and time.   Psychiatric:         Mood and Affect: Mood normal.         Behavior: Behavior normal.         Procedures    Point of Care Test & Imaging Results from this visit  Results for orders placed or performed in visit on 03/26/25   POCT rapid strep A manually resulted   Result Value Ref Range    POC Rapid Strep Negative Negative   POCT SPOTFIRE R/ST Panel Mini w/Strep A ([employer]) manually resulted   Result Value Ref Range    POC Group A Strep, PCR Negative Negative    POC Respiratory Syncytial Virus PCR Negative Negative    POC Influenza A Virus PCR Negative Negative    POC Influenza B Virus PCR Negative Negative    POC Human Rhinovirus PCR Negative Negative      Imaging  No results found.    Cardiology, Vascular, and Other Imaging  No other imaging results found for the past 2 days      Diagnostic study results (if any) were reviewed by JEMIMA Rey.    Assessment/Plan   Allergies, medications, history, and pertinent labs/EKGs/Imaging reviewed by JEMIMA Rey.     Medical Decision Making  POCT negative. See results. Presentation consistent with and discussed viral etiology. Did not appreciate any s/s bacterial infection at this time. Discussed pushing fluids, rest, OTC symptoms management PRN. Close follow up with PCP. Patient verbalized understanding and agreed with the plan of care.      At time of discharge, patient  was clinically well-appearing and appropriate for outpatient management. The patient/parent/guardian was educated regarding diagnosis, supportive care, OTC and Rx medications. The patient/parent/guardian was given the opportunity to ask questions prior to discharge. They verbalized understanding of discussion of treatment plan, expected course of illness and/or injury, indications on when to return to , when to seek further evaluation in ED/call 911, and the need to follow up with PCP and/or specialist as referred. Patient/parent/guardian was provided with work/school documentation if requested. Patient stable upon discharge.      Orders and Diagnoses  Diagnoses and all orders for this visit:  Acute sore throat  -     POCT rapid strep A manually resulted  -     POCT SPOTFIRE R/ST Panel Mini w/Strep A ([facility]) manually resulted      Medical Admin Record      Patient disposition: Home    Electronically signed by JEMIMA Rey  4:30 PM

## 2025-08-07 ENCOUNTER — APPOINTMENT (OUTPATIENT)
Dept: OBSTETRICS AND GYNECOLOGY | Facility: CLINIC | Age: 35
End: 2025-08-07
Payer: COMMERCIAL

## 2025-08-22 ENCOUNTER — APPOINTMENT (OUTPATIENT)
Dept: OBSTETRICS AND GYNECOLOGY | Facility: CLINIC | Age: 35
End: 2025-08-22
Payer: COMMERCIAL

## 2025-08-22 VITALS
SYSTOLIC BLOOD PRESSURE: 110 MMHG | HEIGHT: 61 IN | DIASTOLIC BLOOD PRESSURE: 70 MMHG | WEIGHT: 165 LBS | BODY MASS INDEX: 31.15 KG/M2

## 2025-08-22 DIAGNOSIS — Z01.419 WELL WOMAN EXAM: ICD-10-CM

## 2025-08-22 PROCEDURE — 3008F BODY MASS INDEX DOCD: CPT | Performed by: OBSTETRICS & GYNECOLOGY

## 2025-08-22 PROCEDURE — 99395 PREV VISIT EST AGE 18-39: CPT | Performed by: OBSTETRICS & GYNECOLOGY

## 2025-08-22 RX ORDER — CLINDAMYCIN HYDROCHLORIDE 300 MG/1
1 CAPSULE ORAL
COMMUNITY
Start: 2025-08-13

## 2025-08-22 ASSESSMENT — PAIN SCALES - GENERAL: PAINLEVEL_OUTOF10: 0-NO PAIN

## 2025-11-12 ENCOUNTER — APPOINTMENT (OUTPATIENT)
Dept: PRIMARY CARE | Facility: CLINIC | Age: 35
End: 2025-11-12
Payer: COMMERCIAL

## 2025-11-25 ENCOUNTER — APPOINTMENT (OUTPATIENT)
Dept: PRIMARY CARE | Facility: CLINIC | Age: 35
End: 2025-11-25
Payer: COMMERCIAL

## 2026-08-25 ENCOUNTER — APPOINTMENT (OUTPATIENT)
Dept: OBSTETRICS AND GYNECOLOGY | Facility: CLINIC | Age: 36
End: 2026-08-25
Payer: COMMERCIAL